# Patient Record
Sex: FEMALE | Race: BLACK OR AFRICAN AMERICAN | NOT HISPANIC OR LATINO | Employment: FULL TIME | URBAN - METROPOLITAN AREA
[De-identification: names, ages, dates, MRNs, and addresses within clinical notes are randomized per-mention and may not be internally consistent; named-entity substitution may affect disease eponyms.]

---
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
## 2019-01-07 ENCOUNTER — HOSPITAL ENCOUNTER (EMERGENCY)
Facility: HOSPITAL | Age: 19
Discharge: HOME | End: 2019-01-07
Attending: EMERGENCY MEDICINE | Admitting: EMERGENCY MEDICINE
Payer: COMMERCIAL

## 2019-01-07 VITALS
DIASTOLIC BLOOD PRESSURE: 68 MMHG | HEART RATE: 57 BPM | WEIGHT: 265 LBS | HEIGHT: 64 IN | OXYGEN SATURATION: 100 % | SYSTOLIC BLOOD PRESSURE: 140 MMHG | RESPIRATION RATE: 16 BRPM | BODY MASS INDEX: 45.24 KG/M2 | TEMPERATURE: 98.3 F

## 2019-01-07 DIAGNOSIS — J11.1 INFLUENZA: Primary | ICD-10-CM

## 2019-01-07 PROCEDURE — 99283 EMERGENCY DEPT VISIT LOW MDM: CPT

## 2019-01-07 NOTE — DISCHARGE INSTRUCTIONS
Influenza, Ambulatory Care   GENERAL INFORMATION:   Influenza (the flu) is an infection caused by the influenza virus  The flu is easily spread when an infected person coughs, sneezes, or has close contact with others  You may be able to spread the flu to others for 1 week or longer after signs or symptoms appear  Common symptoms include the following:   · Fever and chills    · Headaches, body aches, and muscle or joint pain    · Cough, runny nose, and sore throat    · Loss of appetite, nausea, vomiting, or diarrhea    · Tiredness    · Trouble breathing  Seek immediate care for the following symptoms:   · Dizziness    · Urinating less or not at all    · A seizure    · A headache, stiff neck, and confusion    · Trouble breathing with blue or purple lips    · New pain or pressure in your chest  Treatment for influenza  may include any of the following:  · Acetaminophen  decreases pain and fever  It is available without a doctor's order  Ask your healthcare provider how much to take and how often to take it  Follow directions  Acetaminophen can cause liver damage if not taken correctly  · NSAIDs  help decrease swelling and pain or fever  This medicine is available with or without a doctor's order  NSAIDs can cause stomach bleeding or kidney problems in certain people  If you take blood thinner medicine, always ask your healthcare provider if NSAIDs are safe for you  Always read the medicine label and follow directions  · Antivirals  are given to fight an infection caused by a virus  Manage your symptoms:   · Get more rest and sleep  to help you get better faster when you have the flu  · Drink more liquids as directed  Ask your healthcare provider how much liquid to drink each day and which liquids are best for you  Drinking liquids helps prevent dehydration  Prevent the spread of influenza:   · Wash your hands often  Use soap and water  Use gel hand cleanser when there is no soap and water available   Do not touch your eyes, nose, or mouth unless you have washed your hands first            · Cover your mouth and nose with a tissue when you sneeze or cough  Throw the tissue in the trash right away  · Clean shared items  Clean table surfaces, doorknobs, and light switches with a germ-killing   Do not share towels, silverware, or dishes with people who are sick  Wash bed sheets, towels, silverware, and dishes with soap and water  · Wear a face mask  over your mouth and nose if you have the flu or are near anyone who has the flu  Ask where to buy single-use masks  · Stay home if you are sick  Stay away from others as much as possible while you recover  · Get an influenza vaccine  to help prevent the flu  Everyone older than age 7 months should get a yearly influenza vaccine  Get the vaccine as soon as it is available, usually in October or November each year  Follow up with your healthcare provider as directed:  Write down your questions so you remember to ask them during your visits  CARE AGREEMENT:   You have the right to help plan your care  Learn about your health condition and how it may be treated  Discuss treatment options with your caregivers to decide what care you want to receive  You always have the right to refuse treatment  The above information is an  only  It is not intended as medical advice for individual conditions or treatments  Talk to your doctor, nurse or pharmacist before following any medical regimen to see if it is safe and effective for you  © 2014 [address] is for End User's use only and may not be sold, redistributed or otherwise used for commercial purposes  All illustrations and images included in CareNotes® are the copyrighted property of A [employer]  or [employer]w [employer]

## 2019-01-07 NOTE — ED PROVIDER NOTES
History  Chief Complaint   Patient presents with    URI     cough congestion body aches  Onset saturday night  No flu shot       History provided by:  Patient  URI   Presenting symptoms: congestion, fever, rhinorrhea and sore throat    Presenting symptoms: no cough    Severity:  Moderate  Onset quality:  Gradual  Duration:  3 days  Timing:  Intermittent  Progression:  Waxing and waning  Chronicity:  New  Relieved by:  Nothing (dayquil)  Worsened by:  Nothing  Ineffective treatments:  None tried  Associated symptoms: myalgias    Associated symptoms: no headaches, no neck pain, no swollen glands and no wheezing    Risk factors: sick contacts    Risk factors: no recent illness        Prior to Admission Medications   Prescriptions Last Dose Informant Patient Reported? Taking?   ibuprofen (MOTRIN) 600 mg tablet   No No   Sig: Take 1 tablet by mouth every 6 (six) hours as needed for mild pain for up to 10 days      Facility-Administered Medications: None       No past medical history on file  No past surgical history on file  No family history on file  I have reviewed and agree with the history as documented  Social History   Substance Use Topics    Smoking status: Never Smoker    Smokeless tobacco: Never Used    Alcohol use No        Review of Systems   Constitutional: Positive for fever  Negative for activity change, chills and diaphoresis  HENT: Positive for congestion, rhinorrhea and sore throat  Negative for sinus pressure  Eyes: Negative for pain and visual disturbance  Respiratory: Negative for cough, chest tightness, shortness of breath, wheezing and stridor  Cardiovascular: Negative for chest pain and palpitations  Gastrointestinal: Negative for abdominal distention, abdominal pain, constipation, diarrhea, nausea and vomiting  Genitourinary: Negative for dysuria and frequency  Musculoskeletal: Positive for myalgias  Negative for neck pain and neck stiffness     Skin: Negative for rash    Neurological: Negative for dizziness, speech difficulty, light-headedness, numbness and headaches  Physical Exam  Physical Exam   Constitutional: She is oriented to person, place, and time  She appears well-developed  No distress  HENT:   Head: Normocephalic and atraumatic  Nasal congestion, cobblestoning     Eyes: Pupils are equal, round, and reactive to light  Neck: Normal range of motion  Neck supple  No tracheal deviation present  Cardiovascular: Normal rate, regular rhythm, normal heart sounds and intact distal pulses  No murmur heard  Pulmonary/Chest: Effort normal and breath sounds normal  No stridor  No respiratory distress  Abdominal: Soft  She exhibits no distension  There is no tenderness  There is no rebound and no guarding  Musculoskeletal: Normal range of motion  Neurological: She is alert and oriented to person, place, and time  Skin: Skin is warm and dry  She is not diaphoretic  No erythema  No pallor  Psychiatric: She has a normal mood and affect  Vitals reviewed  Vital Signs  ED Triage Vitals [01/07/19 1244]   Temperature Pulse Respirations Blood Pressure SpO2   98 3 °F (36 8 °C) 57 16 140/68 100 %      Temp Source Heart Rate Source Patient Position - Orthostatic VS BP Location FiO2 (%)   Oral Monitor Sitting Left arm --      Pain Score       8           Vitals:    01/07/19 1244   BP: 140/68   Pulse: 57   Patient Position - Orthostatic VS: Sitting       Visual Acuity      ED Medications  Medications - No data to display    Diagnostic Studies  Results Reviewed     None                 No orders to display              Procedures  Procedures       Phone Contacts  ED Phone Contact    ED Course                               MDM  Number of Diagnoses or Management Options  Influenza: new and requires workup  Diagnosis management comments: Pt presents with influenze like illness, out of tamiful window, tx with supportive care           Amount and/or Complexity of Data Reviewed  Review and summarize past medical records: yes      CritCare Time    Disposition  Final diagnoses:   Influenza     Time reflects when diagnosis was documented in both MDM as applicable and the Disposition within this note     Time User Action Codes Description Comment    1/7/2019  1:17 PM Loi De Leon Add [J11 1] Influenza       ED Disposition     ED Disposition Condition Comment    Discharge  Mita Guerrero discharge to home/self care  Condition at discharge: Good        Follow-up Information     Follow up With Specialties Details Why Contact Info Additional [address] Emergency Department Emergency Medicine Go to If symptoms worsen [address] [facility]  AN ED, [address], [place], [address]          Patient's Medications   Discharge Prescriptions    No medications on file     No discharge procedures on file      ED Provider  Electronically Signed by           Chantlele Gutierrez DO  01/07/19 6058

## 2019-02-22 ENCOUNTER — HOSPITAL ENCOUNTER (EMERGENCY)
Facility: HOSPITAL | Age: 19
Discharge: HOME | End: 2019-02-22
Attending: EMERGENCY MEDICINE

## 2019-02-22 VITALS
BODY MASS INDEX: 45.41 KG/M2 | SYSTOLIC BLOOD PRESSURE: 136 MMHG | TEMPERATURE: 98.1 F | OXYGEN SATURATION: 100 % | HEART RATE: 79 BPM | RESPIRATION RATE: 16 BRPM | DIASTOLIC BLOOD PRESSURE: 99 MMHG | WEIGHT: 264.55 LBS

## 2019-02-22 DIAGNOSIS — J20.9 ACUTE BRONCHITIS: Primary | ICD-10-CM

## 2019-02-22 PROCEDURE — 99283 EMERGENCY DEPT VISIT LOW MDM: CPT

## 2019-02-22 RX ORDER — ALBUTEROL SULFATE 2.5 MG/3ML
5 SOLUTION RESPIRATORY (INHALATION) ONCE
Status: COMPLETED | OUTPATIENT
Start: 2019-02-22 | End: 2019-02-22

## 2019-02-22 RX ORDER — PREDNISONE 20 MG/1
60 TABLET ORAL ONCE
Status: COMPLETED | OUTPATIENT
Start: 2019-02-22 | End: 2019-02-22

## 2019-02-22 RX ORDER — AZITHROMYCIN 250 MG/1
TABLET, FILM COATED ORAL
Qty: 6 TABLET | Refills: 0 | Status: SHIPPED | OUTPATIENT
Start: 2019-02-22 | End: 2019-02-26

## 2019-02-22 RX ORDER — ALBUTEROL SULFATE 90 UG/1
2 AEROSOL, METERED RESPIRATORY (INHALATION) ONCE
Status: COMPLETED | OUTPATIENT
Start: 2019-02-22 | End: 2019-02-22

## 2019-02-22 RX ORDER — PREDNISONE 20 MG/1
TABLET ORAL
Qty: 10 TABLET | Refills: 0 | Status: SHIPPED | OUTPATIENT
Start: 2019-02-23

## 2019-02-22 RX ADMIN — ALBUTEROL SULFATE 5 MG: 2.5 SOLUTION RESPIRATORY (INHALATION) at 07:59

## 2019-02-22 RX ADMIN — PREDNISONE 60 MG: 20 TABLET ORAL at 07:57

## 2019-02-22 RX ADMIN — ALBUTEROL SULFATE 2 PUFF: 90 AEROSOL, METERED RESPIRATORY (INHALATION) at 08:25

## 2019-02-22 RX ADMIN — IPRATROPIUM BROMIDE 0.5 MG: 0.5 SOLUTION RESPIRATORY (INHALATION) at 07:59

## 2019-02-22 NOTE — ED NOTES
Pt  Ambulated out of dept , vss, normal gait, no acute distress       Pa Schroeder, RN  02/22/19 7267

## 2019-02-22 NOTE — ED PROVIDER NOTES
History  Chief Complaint   Patient presents with    URI     Pt states that she has been sick with flu like symptoms for a month  Pt states that she did not follow up with the family doctor  Pt states that she is not getting better  23-year-old female presents to the emergency department for evaluation of cough and nasal congestion for the past 1 month  Patient states that she was evaluated in the emergency department approximately 4 weeks ago  At that time she was diagnosed with an influenza like illness and was told to take guaifenesin and treat her symptoms with fluids and Tylenol  She has been taking guaifenesin for the past 4 weeks but feels that she is getting worse  Today her chest feels tight and she has been coughing  She does feel like she is wheezing  She continues with nasal congestion and drainage  Patient reports headache  No sore throat or fever  No nausea, vomiting, diarrhea  Patient is nontoxic appearing in no acute distress  History provided by:  Patient   used: No    URI   Presenting symptoms: congestion, cough, fatigue and rhinorrhea    Presenting symptoms: no ear pain and no fever    Severity:  Moderate  Onset quality:  Gradual  Duration:  4 weeks  Timing:  Constant  Progression:  Unchanged  Chronicity:  New  Relieved by:  Nothing  Worsened by:  Nothing  Ineffective treatments:  OTC medications  Associated symptoms: headaches and wheezing    Associated symptoms: no arthralgias, no myalgias and no swollen glands    Risk factors: no diabetes mellitus        Prior to Admission Medications   Prescriptions Last Dose Informant Patient Reported? Taking?   ibuprofen (MOTRIN) 600 mg tablet   No No   Sig: Take 1 tablet by mouth every 6 (six) hours as needed for mild pain for up to 10 days      Facility-Administered Medications: None       History reviewed  No pertinent past medical history  History reviewed  No pertinent surgical history  History reviewed  No pertinent family history  I have reviewed and agree with the history as documented  Social History     Tobacco Use    Smoking status: Never Smoker    Smokeless tobacco: Never Used   Substance Use Topics    Alcohol use: No    Drug use: No        Review of Systems   Constitutional: Positive for fatigue  Negative for fever  HENT: Positive for congestion, rhinorrhea and sinus pressure  Negative for ear pain and facial swelling  Respiratory: Positive for cough and wheezing  Gastrointestinal: Negative for abdominal pain, diarrhea and nausea  Musculoskeletal: Negative for arthralgias and myalgias  Neurological: Positive for headaches  All other systems reviewed and are negative  Physical Exam  Physical Exam   Constitutional: She is oriented to person, place, and time  She appears well-developed and well-nourished  HENT:   Head: Normocephalic  Nose: Nose normal    Mouth/Throat: Uvula is midline, oropharynx is clear and moist and mucous membranes are normal  No oropharyngeal exudate  No tonsillar exudate  Eyes: Pupils are equal, round, and reactive to light  Conjunctivae and EOM are normal    Neck: Normal range of motion  Neck supple  Cardiovascular: Normal rate, regular rhythm, normal heart sounds and intact distal pulses  No murmur heard  Pulmonary/Chest: Effort normal  No respiratory distress  She has wheezes  She has rhonchi  Abdominal: Soft  Bowel sounds are normal  She exhibits no distension  There is no tenderness  There is no rebound and no guarding  Musculoskeletal: Normal range of motion  She exhibits no edema, tenderness or deformity  Lymphadenopathy:     She has no cervical adenopathy  Neurological: She is alert and oriented to person, place, and time  She has normal strength and normal reflexes  No cranial nerve deficit or sensory deficit  She exhibits normal muscle tone  Coordination and gait normal    Skin: Skin is warm, dry and intact  No rash noted  Psychiatric: She has a normal mood and affect  Her behavior is normal  Judgment and thought content normal    Nursing note and vitals reviewed  Vital Signs  ED Triage Vitals [02/22/19 0736]   Temperature Pulse Respirations Blood Pressure SpO2   98 1 °F (36 7 °C) 79 16 136/99 100 %      Temp Source Heart Rate Source Patient Position - Orthostatic VS BP Location FiO2 (%)   Oral Monitor Sitting Right arm --      Pain Score       8           Vitals:    02/22/19 0736   BP: 136/99   Pulse: 79   Patient Position - Orthostatic VS: Sitting       Visual Acuity      ED Medications  Medications   albuterol inhalation solution 5 mg (5 mg Nebulization Given 2/22/19 0759)   ipratropium (ATROVENT) 0 02 % inhalation solution 0 5 mg (0 5 mg Nebulization Given 2/22/19 0759)   predniSONE tablet 60 mg (60 mg Oral Given 2/22/19 0757)   albuterol (PROVENTIL HFA,VENTOLIN HFA) inhaler 2 puff (2 puffs Inhalation Given 2/22/19 0825)       Diagnostic Studies  Results Reviewed     None                 No orders to display              Procedures  Procedures       Phone Contacts  ED Phone Contact    ED Course                               MDM  Number of Diagnoses or Management Options  Acute bronchitis: new and requires workup     Amount and/or Complexity of Data Reviewed  Decide to obtain previous medical records or to obtain history from someone other than the patient: yes  Discuss the patient with other providers: yes    Risk of Complications, Morbidity, and/or Mortality  General comments: 22-year-old female presents with 1 month history of worsening cough and congestion, today she is wheezing  Patient feels overall her symptoms are worsening and not improving as expected  Patient has acute wheezy bronchitis  Will treat with oral corticosteroid and inhaler, will add antibiotic due to prolonged nature of her symptoms  Discussed signs and symptoms to return to the emergency department    Patient did have improved air flow after receiving nebulizer treatment in the emergency department  Patient Progress  Patient progress: stable      Disposition  Final diagnoses:   Acute bronchitis     Time reflects when diagnosis was documented in both MDM as applicable and the Disposition within this note     Time User Action Codes Description Comment    2/22/2019  8:19 AM Coni Fabry Add [J20 9] Acute bronchitis       ED Disposition     ED Disposition Condition Date/Time Comment    Discharge Stable Fri Feb 22, 2019  8:19 AM Genaro Jeffery discharge to home/self care  Follow-up Information    None         Patient's Medications   Discharge Prescriptions    AZITHROMYCIN (ZITHROMAX) 250 MG TABLET    Take 2 tablets today then 1 tablet daily x 4 days       Start Date: 2/22/2019 End Date: 2/26/2019       Order Dose: --       Quantity: 6 tablet    Refills: 0    PREDNISONE 20 MG TABLET    Take 2 tabs daily for 5 days       Start Date: 2/23/2019 End Date: --       Order Dose: --       Quantity: 10 tablet    Refills: 0     No discharge procedures on file      ED Provider  Electronically Signed by           Sarah Ferrara DO  02/22/19 0830

## 2020-05-11 ENCOUNTER — TELEPHONE (OUTPATIENT)
Dept: OBGYN CLINIC | Facility: CLINIC | Age: 20
End: 2020-05-11

## 2020-05-18 ENCOUNTER — TELEMEDICINE (OUTPATIENT)
Dept: OBGYN CLINIC | Facility: CLINIC | Age: 20
End: 2020-05-18

## 2020-05-18 DIAGNOSIS — Z30.09 ENCOUNTER FOR OTHER GENERAL COUNSELING OR ADVICE ON CONTRACEPTION: Primary | ICD-10-CM

## 2020-05-18 PROCEDURE — G2012 BRIEF CHECK IN BY MD/QHP: HCPCS | Performed by: OBSTETRICS & GYNECOLOGY

## 2020-06-30 ENCOUNTER — TELEPHONE (OUTPATIENT)
Dept: OBGYN CLINIC | Facility: CLINIC | Age: 20
End: 2020-06-30

## 2020-06-30 NOTE — TELEPHONE ENCOUNTER
Pt called asking about nexplanon , wondering if benefits have been verified and if have them in stock    Pt made aware, they are in  to be ordered, will call once they are approved    Offered to schedule appt or have provider prescribe another method  Of birth control until they are approved      Pt declined

## 2021-12-30 ENCOUNTER — HOSPITAL ENCOUNTER (EMERGENCY)
Facility: HOSPITAL | Age: 21
Discharge: HOME | End: 2021-12-31
Attending: EMERGENCY MEDICINE | Admitting: EMERGENCY MEDICINE
Payer: OTHER MISCELLANEOUS

## 2021-12-30 DIAGNOSIS — M54.9 BACK PAIN: Primary | ICD-10-CM

## 2021-12-30 LAB
ALBUMIN SERPL BCP-MCNC: 3.6 G/DL (ref 3.5–5)
ALP SERPL-CCNC: 76 U/L (ref 46–116)
ALT SERPL W P-5'-P-CCNC: 40 U/L (ref 12–78)
ANION GAP SERPL CALCULATED.3IONS-SCNC: 8 MMOL/L (ref 4–13)
AST SERPL W P-5'-P-CCNC: 19 U/L (ref 5–45)
BASOPHILS # BLD AUTO: 0.04 THOUSANDS/ΜL (ref 0–0.1)
BASOPHILS NFR BLD AUTO: 1 % (ref 0–1)
BILIRUB SERPL-MCNC: 0.37 MG/DL (ref 0.2–1)
BUN SERPL-MCNC: 10 MG/DL (ref 5–25)
CALCIUM SERPL-MCNC: 9.5 MG/DL (ref 8.3–10.1)
CHLORIDE SERPL-SCNC: 102 MMOL/L (ref 100–108)
CO2 SERPL-SCNC: 26 MMOL/L (ref 21–32)
CREAT SERPL-MCNC: 0.95 MG/DL (ref 0.6–1.3)
EOSINOPHIL # BLD AUTO: 0.04 THOUSAND/ΜL (ref 0–0.61)
EOSINOPHIL NFR BLD AUTO: 1 % (ref 0–6)
ERYTHROCYTE [DISTWIDTH] IN BLOOD BY AUTOMATED COUNT: 13 % (ref 11.6–15.1)
GFR SERPL CREATININE-BSD FRML MDRD: 84 ML/MIN/1.73SQ M
GLUCOSE SERPL-MCNC: 86 MG/DL (ref 65–140)
HCT VFR BLD AUTO: 39.9 % (ref 34.8–46.1)
HGB BLD-MCNC: 13.1 G/DL (ref 11.5–15.4)
IMM GRANULOCYTES # BLD AUTO: 0.03 THOUSAND/UL (ref 0–0.2)
IMM GRANULOCYTES NFR BLD AUTO: 0 % (ref 0–2)
LYMPHOCYTES # BLD AUTO: 2.27 THOUSANDS/ΜL (ref 0.6–4.47)
LYMPHOCYTES NFR BLD AUTO: 33 % (ref 14–44)
MCH RBC QN AUTO: 28.4 PG (ref 26.8–34.3)
MCHC RBC AUTO-ENTMCNC: 32.8 G/DL (ref 31.4–37.4)
MCV RBC AUTO: 87 FL (ref 82–98)
MONOCYTES # BLD AUTO: 0.83 THOUSAND/ΜL (ref 0.17–1.22)
MONOCYTES NFR BLD AUTO: 12 % (ref 4–12)
NEUTROPHILS # BLD AUTO: 3.65 THOUSANDS/ΜL (ref 1.85–7.62)
NEUTS SEG NFR BLD AUTO: 53 % (ref 43–75)
NRBC BLD AUTO-RTO: 0 /100 WBCS
PLATELET # BLD AUTO: 262 THOUSANDS/UL (ref 149–390)
PMV BLD AUTO: 10.6 FL (ref 8.9–12.7)
POTASSIUM SERPL-SCNC: 3.6 MMOL/L (ref 3.5–5.3)
PROT SERPL-MCNC: 7.8 G/DL (ref 6.4–8.2)
RBC # BLD AUTO: 4.61 MILLION/UL (ref 3.81–5.12)
SODIUM SERPL-SCNC: 136 MMOL/L (ref 136–145)
WBC # BLD AUTO: 6.86 THOUSAND/UL (ref 4.31–10.16)

## 2021-12-30 PROCEDURE — 84703 CHORIONIC GONADOTROPIN ASSAY: CPT | Performed by: EMERGENCY MEDICINE

## 2021-12-30 PROCEDURE — 96374 THER/PROPH/DIAG INJ IV PUSH: CPT

## 2021-12-30 PROCEDURE — 99284 EMERGENCY DEPT VISIT MOD MDM: CPT

## 2021-12-30 PROCEDURE — 80053 COMPREHEN METABOLIC PANEL: CPT | Performed by: EMERGENCY MEDICINE

## 2021-12-30 PROCEDURE — 96375 TX/PRO/DX INJ NEW DRUG ADDON: CPT

## 2021-12-30 PROCEDURE — 85025 COMPLETE CBC W/AUTO DIFF WBC: CPT | Performed by: EMERGENCY MEDICINE

## 2021-12-30 PROCEDURE — 99284 EMERGENCY DEPT VISIT MOD MDM: CPT | Performed by: EMERGENCY MEDICINE

## 2021-12-30 PROCEDURE — 36415 COLL VENOUS BLD VENIPUNCTURE: CPT | Performed by: EMERGENCY MEDICINE

## 2021-12-30 RX ORDER — KETOROLAC TROMETHAMINE 30 MG/ML
15 INJECTION, SOLUTION INTRAMUSCULAR; INTRAVENOUS ONCE
Status: COMPLETED | OUTPATIENT
Start: 2021-12-30 | End: 2021-12-30

## 2021-12-30 RX ADMIN — KETOROLAC TROMETHAMINE 15 MG: 30 INJECTION, SOLUTION INTRAMUSCULAR at 22:57

## 2021-12-30 RX ADMIN — Medication 2 MG: at 22:57

## 2021-12-30 NOTE — Clinical Note
Matt Darrionron was seen and treated in our emergency department on 12/30/2021  Diagnosis:     Malia Balbuena  may return to work on return date  She may return on this date: 01/04/2022         If you have any questions or concerns, please don't hesitate to call        Chuck Gross MD    ______________________________           _______________          _______________  [facility] [place] [facility]                              Date                                Time

## 2021-12-31 ENCOUNTER — APPOINTMENT (EMERGENCY)
Dept: MRI IMAGING | Facility: HOSPITAL | Age: 21
End: 2021-12-31
Payer: OTHER MISCELLANEOUS

## 2021-12-31 VITALS
OXYGEN SATURATION: 99 % | HEART RATE: 94 BPM | RESPIRATION RATE: 20 BRPM | SYSTOLIC BLOOD PRESSURE: 128 MMHG | DIASTOLIC BLOOD PRESSURE: 72 MMHG | TEMPERATURE: 99 F

## 2021-12-31 LAB — HCG SERPL QL: NEGATIVE

## 2021-12-31 PROCEDURE — 72148 MRI LUMBAR SPINE W/O DYE: CPT

## 2021-12-31 PROCEDURE — G1004 CDSM NDSC: HCPCS

## 2021-12-31 RX ORDER — CYCLOBENZAPRINE HCL 10 MG
10 TABLET ORAL 2 TIMES DAILY PRN
Qty: 8 TABLET | Refills: 0 | Status: SHIPPED | OUTPATIENT
Start: 2021-12-31

## 2021-12-31 RX ORDER — ACETAMINOPHEN 325 MG/1
650 TABLET ORAL ONCE
Status: COMPLETED | OUTPATIENT
Start: 2021-12-31 | End: 2021-12-31

## 2021-12-31 RX ORDER — ACETAMINOPHEN 325 MG/1
650 TABLET ORAL EVERY 6 HOURS PRN
Qty: 30 TABLET | Refills: 0 | Status: SHIPPED | OUTPATIENT
Start: 2021-12-31 | End: 2022-01-05

## 2021-12-31 RX ORDER — IBUPROFEN 400 MG/1
400 TABLET ORAL EVERY 8 HOURS SCHEDULED
Qty: 15 TABLET | Refills: 0 | Status: SHIPPED | OUTPATIENT
Start: 2021-12-31 | End: 2022-01-05

## 2021-12-31 RX ADMIN — DICLOFENAC SODIUM TOPICAL GEL, 1%, 4 G: 10 GEL TOPICAL at 02:49

## 2021-12-31 RX ADMIN — ACETAMINOPHEN 650 MG: 325 TABLET, FILM COATED ORAL at 02:49

## 2021-12-31 NOTE — ED NOTES
Patient transported to [address] [place] [address], [address] [place] [address]  12/31/21 0106

## 2021-12-31 NOTE — ED PROVIDER NOTES
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  Reports mid/low L back pain, seen for it prescribed naproxen and steroid  Pain getting worse, reports numb/tingling down L leg as of today     HPI    Patient is a pleasant 19 yof who presents with achi back pain  Notes this started at work  Patient notes the pain radiates down the legs and is having difficulty lifting her legs  No saddle anesthesia or urinary retention  No f/c/s to suggest abscess  However, during initial exam patient was having difficulty lifting legs, as such, MRI ordered for back pain with weakness to rule out cauda equina  MDM back pain, MRI Negative for acute process, suspect MSK pain, will dc, followup Spine and pain if symptoms continue  Prior to Admission Medications   Prescriptions Last Dose Informant Patient Reported? Taking?   ibuprofen (MOTRIN) 600 mg tablet   No No   Sig: Take 1 tablet by mouth every 6 (six) hours as needed for mild pain for up to 10 days   predniSONE 20 mg tablet Not Taking at Unknown time  No No   Sig: Take 2 tabs daily for 5 days   Patient not taking: Reported on 12/30/2021       Facility-Administered Medications: None       History reviewed  No pertinent past medical history  History reviewed  No pertinent surgical history  History reviewed  No pertinent family history  I have reviewed and agree with the history as documented  E-Cigarette/Vaping     E-Cigarette/Vaping Substances     Social History     Tobacco Use    Smoking status: Never Smoker    Smokeless tobacco: Never Used   Substance Use Topics    Alcohol use: No     Comment: socially     Drug use: No       Review of Systems   Musculoskeletal: Positive for back pain  All other systems reviewed and are negative  Physical Exam  Physical Exam  Vitals and nursing note reviewed     Constitutional:       Appearance: She is well-developed  HENT:      Head: Normocephalic and atraumatic  Right Ear: External ear normal       Left Ear: External ear normal    Eyes:      Conjunctiva/sclera: Conjunctivae normal    Neck:      Vascular: No JVD  Trachea: No tracheal deviation  Cardiovascular:      Rate and Rhythm: Normal rate and regular rhythm  Heart sounds: Normal heart sounds  Pulmonary:      Effort: Pulmonary effort is normal  No respiratory distress  Breath sounds: No wheezing or rales  Abdominal:      Palpations: Abdomen is soft  Tenderness: There is no abdominal tenderness  There is no guarding or rebound  Musculoskeletal:         General: No tenderness  Cervical back: Normal range of motion and neck supple  Skin:     General: Skin is warm and dry  Findings: No erythema or rash  Neurological:      General: No focal deficit present  Mental Status: She is alert and oriented to person, place, and time  Motor: No weakness  Psychiatric:         Behavior: Behavior normal          Thought Content:  Thought content normal          Vital Signs  ED Triage Vitals [12/30/21 2107]   Temperature Pulse Respirations Blood Pressure SpO2   99 °F (37 2 °C) 96 20 137/75 100 %      Temp Source Heart Rate Source Patient Position - Orthostatic VS BP Location FiO2 (%)   Oral Monitor Sitting Right arm --      Pain Score       9           Vitals:    12/30/21 2107 12/31/21 0131   BP: 137/75 128/72   Pulse: 96 94   Patient Position - Orthostatic VS: Sitting Lying         Visual Acuity  Visual Acuity      Most Recent Value   L Pupil Size (mm) 3   R Pupil Size (mm) 3          ED Medications  Medications   ketorolac (TORADOL) injection 15 mg (15 mg Intravenous Given 12/30/21 2257)   acetaminophen (TYLENOL) tablet 650 mg (650 mg Oral Given 12/31/21 0249)   Diclofenac Sodium (VOLTAREN) 1 % topical gel 4 g (4 g Topical Given 12/31/21 0249)       Diagnostic Studies  Results Reviewed     Procedure Component Value Units Date/Time    hCG, qualitative pregnancy [[id number]]  (Normal) Collected: 12/30/21 2235    Lab Status: Final result Specimen: Blood from Arm, Left Updated: 12/31/21 0116     Preg, Serum Negative    Comprehensive metabolic panel [[id number]] Collected: 12/30/21 2235    Lab Status: Final result Specimen: Blood from Arm, Left Updated: 12/30/21 2301     Sodium 136 mmol/L      Potassium 3 6 mmol/L      Chloride 102 mmol/L      CO2 26 mmol/L      ANION GAP 8 mmol/L      BUN 10 mg/dL      Creatinine 0 95 mg/dL      Glucose 86 mg/dL      Calcium 9 5 mg/dL      AST 19 U/L      ALT 40 U/L      Alkaline Phosphatase 76 U/L      Total Protein 7 8 g/dL      Albumin 3 6 g/dL      Total Bilirubin 0 37 mg/dL      eGFR 84 ml/min/1 73sq m     Narrative:      [place] guidelines for Chronic Kidney Disease (CKD):     Stage 1 with normal or high GFR (GFR > 90 mL/min/1 73 square meters)    Stage 2 Mild CKD (GFR = 60-89 mL/min/1 73 square meters)    Stage 3A Moderate CKD (GFR = 45-59 mL/min/1 73 square meters)    Stage 3B Moderate CKD (GFR = 30-44 mL/min/1 73 square meters)    Stage 4 Severe CKD (GFR = 15-29 mL/min/1 73 square meters)    Stage 5 End Stage CKD (GFR <15 mL/min/1 73 square meters)  Note: GFR calculation is accurate only with a steady state creatinine    CBC and differential [[id number]] Collected: 12/30/21 2235    Lab Status: Final result Specimen: Blood from Arm, Left Updated: 12/30/21 2248     WBC 6 86 Thousand/uL      RBC 4 61 Million/uL      Hemoglobin 13 1 g/dL      Hematocrit 39 9 %      MCV 87 fL      MCH 28 4 pg      MCHC 32 8 g/dL      RDW 13 0 %      MPV 10 6 fL      Platelets 963 Thousands/uL      nRBC 0 /100 WBCs      Neutrophils Relative 53 %      Immat GRANS % 0 %      Lymphocytes Relative 33 %      Monocytes Relative 12 %      Eosinophils Relative 1 %      Basophils Relative 1 %      Neutrophils Absolute 3 65 Thousands/µL      Immature Grans Absolute 0 03 Thousand/uL Lymphocytes Absolute 2 27 Thousands/µL      Monocytes Absolute 0 83 Thousand/µL      Eosinophils Absolute 0 04 Thousand/µL      Basophils Absolute 0 04 Thousands/µL                  MRI lumbar spine wo contrast   Final Result by Mike Wright MD (12/31 0115)      Unremarkable unenhanced MRI of the lumbar spine  Workstation performed: [id number]                    Procedures  Procedures         ED Course                               SBIRT 22yo+      Most Recent Value   SBIRT (24 yo +)    In order to provide better care to our patients, we are screening all of our patients for alcohol and drug use  Would it be okay to ask you these screening questions? No Filed at: 12/30/2021 2318                    MDM    Disposition  Final diagnoses:   Back pain     Time reflects when diagnosis was documented in both MDM as applicable and the Disposition within this note     Time User Action Codes Description Comment    12/31/2021  2:37 AM Bertha HERNANDES Add [M54 9] Back pain       ED Disposition     ED Disposition Condition Date/Time Comment    Discharge Stable Fri Dec 31, 2021  2:37 AM Toño Soto discharge to home/self care              Follow-up Information     Follow up With Specialties Details Why Contact Info    Shira Robles MD Family Medicine In 1 day  [address] [phone]      [facility]  Call in 3 days  [facility]  [address]   [phone]          Discharge Medication List as of 12/31/2021  2:45 AM      START taking these medications    Details   acetaminophen (TYLENOL) 325 mg tablet Take 2 tablets (650 mg total) by mouth every 6 (six) hours as needed for mild pain for up to 5 days, Starting Fri 12/31/2021, Until Wed 1/5/2022 at 2359, Print      cyclobenzaprine (FLEXERIL) 10 mg tablet Take 1 tablet (10 mg total) by mouth 2 (two) times a day as needed for muscle spasms, Starting Fri 12/31/2021, Print      Diclofenac Sodium (VOLTAREN) 1 % Apply 2 g topically 4 (four) times a day, Starting Fri 12/31/2021, Print         CONTINUE these medications which have CHANGED    Details   ibuprofen (MOTRIN) 400 mg tablet Take 1 tablet (400 mg total) by mouth every 8 (eight) hours for 5 days, Starting Fri 12/31/2021, Until Wed 1/5/2022, Print         CONTINUE these medications which have NOT CHANGED    Details   predniSONE 20 mg tablet Take 2 tabs daily for 5 days, Print             No discharge procedures on file      PDMP Review     None          ED Provider  Electronically Signed by           Gagan Stark MD  01/01/22 2536

## 2021-12-31 NOTE — ED NOTES
Family at bedside  Pt states right foot is cold and numb and painful  Pt tearful while on stretcher       Lynne Freitas RN  12/30/21 4959

## 2024-12-16 ENCOUNTER — OFFICE VISIT (OUTPATIENT)
Dept: URGENT CARE | Age: 24
End: 2024-12-16
Payer: COMMERCIAL

## 2024-12-16 VITALS
SYSTOLIC BLOOD PRESSURE: 148 MMHG | RESPIRATION RATE: 18 BRPM | BODY MASS INDEX: 59.56 KG/M2 | HEART RATE: 90 BPM | OXYGEN SATURATION: 98 % | DIASTOLIC BLOOD PRESSURE: 90 MMHG | WEIGHT: 293 LBS | TEMPERATURE: 98.3 F

## 2024-12-16 DIAGNOSIS — R51.9 INTRACTABLE HEADACHE, UNSPECIFIED CHRONICITY PATTERN, UNSPECIFIED HEADACHE TYPE: Primary | ICD-10-CM

## 2024-12-16 PROCEDURE — G0383 LEV 4 HOSP TYPE B ED VISIT: HCPCS | Performed by: PHYSICIAN ASSISTANT

## 2024-12-16 PROCEDURE — 96372 THER/PROPH/DIAG INJ SC/IM: CPT | Performed by: PHYSICIAN ASSISTANT

## 2024-12-16 RX ORDER — ONDANSETRON 4 MG/1
4 TABLET, ORALLY DISINTEGRATING ORAL ONCE
Status: COMPLETED | OUTPATIENT
Start: 2024-12-16 | End: 2024-12-16

## 2024-12-16 RX ORDER — KETOROLAC TROMETHAMINE 30 MG/ML
15 INJECTION, SOLUTION INTRAMUSCULAR; INTRAVENOUS ONCE
Status: COMPLETED | OUTPATIENT
Start: 2024-12-16 | End: 2024-12-16

## 2024-12-16 RX ADMIN — KETOROLAC TROMETHAMINE 15 MG: 30 INJECTION, SOLUTION INTRAMUSCULAR; INTRAVENOUS at 16:55

## 2024-12-16 RX ADMIN — ONDANSETRON 4 MG: 4 TABLET, ORALLY DISINTEGRATING ORAL at 16:55

## 2024-12-16 NOTE — LETTER
December 16, 2024     Patient: Yee Barber   YOB: 2000   Date of Visit: 12/16/2024       To Whom It May Concern:    It is my medical opinion that Yee Barber may return to work on 12/17/24 .    If you have any questions or concerns, please don't hesitate to call.         Sincerely,        Jose C Mahan PA-C    CC: No Recipients

## 2024-12-16 NOTE — PROGRESS NOTES
[facility]        NAME: Yee Braber is a 24 y.o. female  : 2000    MRN: [id number]  DATE: 2024  TIME: 6:58 PM    Assessment and Plan   Intractable headache, unspecified chronicity pattern, unspecified headache type [R51.9]  1. Intractable headache, unspecified chronicity pattern, unspecified headache type  ketorolac (TORADOL) injection 15 mg    ondansetron (ZOFRAN-ODT) dispersible tablet 4 mg          Patient will take Benadryl at home given that she is driving herself.  If this does not work at terminating the current headache recommend she be seen in the emergency department otherwise follow-up PCP      The patient and/or parent/legal guardian verbalized understanding of exam findings and   Treatment plan. We engaged in the shared decision-making process and treatment options were   discussed at length with the patient.  All questions, concerns and complaints were answered and   addressed to the patient's' and/or parent/legal guardians's satisfaction.    Patient Instructions   There are no Patient Instructions on file for this visit.    Follow up with PCP in 3-5 days.  Proceed to  ER if symptoms worsen.    If tests are performed, our office will contact you with results only if   changes need to made to the care plan discussed with you at the visit.   [facility]     Chief Complaint     Chief Complaint   Patient presents with   • Headache     Patient states she has a migraine x 3 days that worsens at night and if she is in the dark it helps slightly          History of Present Illness       HPI  . Migraine has been nonstop for 72 hours. Tried aki and tylenol with no relief. Not being treated for nor has been diagnosed with migraines. Has had headaches like this before. Hot tea usually helps as well but not this time. No visual disturbances. Feels slight dizziness when lying supine, no gait or balance disturbance. No focal neuro deficits  noted.     Review of Systems   Review of Systems  All other related systems reviewed with patient or accompanying historian and are negative except as noted in HPI    Current Medications       Current Outpatient Medications:   •  cyclobenzaprine (FLEXERIL) 10 mg tablet, Take 1 tablet (10 mg total) by mouth 2 (two) times a day as needed for muscle spasms, Disp: 8 tablet, Rfl: 0  •  Diclofenac Sodium (VOLTAREN) 1 %, Apply 2 g topically 4 (four) times a day, Disp: 150 g, Rfl: 0  •  ibuprofen (MOTRIN) 400 mg tablet, Take 1 tablet (400 mg total) by mouth every 8 (eight) hours for 5 days, Disp: 15 tablet, Rfl: 0  •  predniSONE 20 mg tablet, Take 2 tabs daily for 5 days (Patient not taking: Reported on 12/30/2021 ), Disp: 10 tablet, Rfl: 0  No current facility-administered medications for this visit.    Current Allergies     Allergies as of 12/16/2024   • (No Known Allergies)            The following portions of the patient's history were reviewed and updated as appropriate: allergies, current medications, past family history, past medical history, past social history, past surgical history and problem list.     No past medical history on file.    No past surgical history on file.    No family history on file.      Medications have been verified.        Objective   /90 (BP Location: Left arm, Patient Position: Sitting, Cuff Size: Adult)   Pulse 90   Temp 98.3 °F (36.8 °C) (Tympanic)   Resp 18   Wt (!) 157 kg (347 lb)   SpO2 98%   BMI 59.56 kg/m²   No LMP recorded. Patient has had an implant.       Physical Exam     Physical Exam  Constitutional:       General: She is not in acute distress.     Appearance: She is well-developed.   HENT:      Head: Normocephalic and atraumatic.   Eyes:      General: No scleral icterus.     Conjunctiva/sclera: Conjunctivae normal.   Neck:      Trachea: No tracheal deviation.   Pulmonary:      Effort: Pulmonary effort is normal. No respiratory distress.      Breath sounds: No  "stridor.   Musculoskeletal:      Cervical back: Normal range of motion.   Skin:     General: Skin is warm and dry.      Findings: No erythema.   Neurological:      General: No focal deficit present.      Mental Status: She is alert and oriented to person, place, and time.      Cranial Nerves: No cranial nerve deficit.      Sensory: No sensory deficit.      Motor: No weakness.      Coordination: Coordination normal.      Gait: Gait normal.      Comments: Negative Romberg test   Psychiatric:         Behavior: Behavior normal.         Ortho Exam        Procedures  No Procedures performed today        Note: Portions of this record may have been created with voice recognition software. Occasional wrong word or \"sound a like\" substitutions may have occurred due to the inherent limitations of voice recognition software. Please read the chart carefully and recognize, using context, where substitutions have occurred.*      "

## 2025-01-24 ENCOUNTER — HOSPITAL ENCOUNTER (EMERGENCY)
Facility: HOSPITAL | Age: 25
Discharge: HOME | End: 2025-01-24
Attending: EMERGENCY MEDICINE
Payer: COMMERCIAL

## 2025-01-24 VITALS
OXYGEN SATURATION: 98 % | SYSTOLIC BLOOD PRESSURE: 108 MMHG | RESPIRATION RATE: 20 BRPM | DIASTOLIC BLOOD PRESSURE: 66 MMHG | HEART RATE: 101 BPM | TEMPERATURE: 99.6 F

## 2025-01-24 DIAGNOSIS — J11.1 INFLUENZA: Primary | ICD-10-CM

## 2025-01-24 LAB
FLUAV AG UPPER RESP QL IA.RAPID: NEGATIVE
FLUBV AG UPPER RESP QL IA.RAPID: POSITIVE
SARS-COV+SARS-COV-2 AG RESP QL IA.RAPID: NEGATIVE

## 2025-01-24 PROCEDURE — 87804 INFLUENZA ASSAY W/OPTIC: CPT

## 2025-01-24 PROCEDURE — 99284 EMERGENCY DEPT VISIT MOD MDM: CPT | Performed by: EMERGENCY MEDICINE

## 2025-01-24 PROCEDURE — 87811 SARS-COV-2 COVID19 W/OPTIC: CPT

## 2025-01-24 PROCEDURE — 99283 EMERGENCY DEPT VISIT LOW MDM: CPT

## 2025-01-24 RX ORDER — ACETAMINOPHEN 325 MG/1
650 TABLET ORAL ONCE
Status: COMPLETED | OUTPATIENT
Start: 2025-01-24 | End: 2025-01-24

## 2025-01-24 RX ORDER — IBUPROFEN 600 MG/1
600 TABLET, FILM COATED ORAL ONCE
Status: COMPLETED | OUTPATIENT
Start: 2025-01-24 | End: 2025-01-24

## 2025-01-24 RX ADMIN — ACETAMINOPHEN 650 MG: 325 TABLET, FILM COATED ORAL at 12:51

## 2025-01-24 RX ADMIN — IBUPROFEN 600 MG: 600 TABLET, FILM COATED ORAL at 14:17

## 2025-01-24 NOTE — Clinical Note
Yee Barber was seen and treated in our emergency department on 1/24/2025.                Diagnosis:     Yee  may return to work on return date.    She may return on this date: 01/27/2025         If you have any questions or concerns, please don't hesitate to call.      Guzman Stock V, DO    ______________________________           _______________          _______________  Hospital Representative                              Date                                Time

## 2025-01-24 NOTE — ED PROVIDER NOTES
Time reflects when diagnosis was documented in both MDM as applicable and the Disposition within this note       Time User Action Codes Description Comment    1/24/2025  2:45 PM Guzman Stock Add [J11.1] Influenza           ED Disposition       ED Disposition   Discharge    Condition   Stable    Date/Time   Fri Jan 24, 2025  2:45 PM    Comment   Yee Barber discharge to home/self care.                   Assessment & Plan       Medical Decision Making  24-year-old female, no past medical history, presenting to the emergency department with flulike symptoms ongoing for the last 5 days.  Initially febrile, tachycardic,  with minimal tachypnea, but no hypoxia.  Appears well but periodically coughing during examination    Differential diagnoses include but not limited to: COVID-19, influenza, less likely pneumonia, asthma exacerbation.    Orders written for COVID, flu swab.    Patient initially treated with Tylenol, for fever.    Patient noted to test positive for influenza.  Not a candidate for Tamiflu at this time given no high risk factors, symptoms ongoing for the last 5 days    On reassessment, patient remains without hypoxia.  Has had improvement in temperature, but mildly more tachycardic, but secondary likely to acute coughing fit. will provide patient with some Motrin, reevaluate symptoms.    On second reassessment, patient appears well, is in no acute distress, comfortable discharge at this time.  Has had continued improvement in vitals, decrease in heart rate, resolution, fever, patient denies any shortness of breath at this time.  Ambulatory with no respiratory distress, lungs clear to auscultation, offers no acute complaints at this time.   Advised the patient on the emergent signs and symptoms for which she should urgently return to the ED and encouraged continued follow-up with her PCP within 1 week for reevaluation of symptoms.  Patient verbalized understanding of plan of care was given the  opportunity ask questions.    Amount and/or Complexity of Data Reviewed  Labs:  Decision-making details documented in ED Course.    Risk  Prescription drug management.        ED Course as of 01/24/25 1522   Fri Jan 24, 2025   1519 Influenza B Rapid Antigen(!): Positive   1519 Temperature: 99.6 °F (37.6 °C)   1519 Pulse: 101   1519 Respirations: 20   1519 SpO2: 98 %       Medications   acetaminophen (TYLENOL) tablet 650 mg (650 mg Oral Given 1/24/25 1251)   ibuprofen (MOTRIN) tablet 600 mg (600 mg Oral Given 1/24/25 1417)       ED Risk Strat Scores                                              History of Present Illness       Chief Complaint   Patient presents with    Flu Symptoms     Pt reports increased work of breathing, fevers, chills, cough since Monday. Dyspneic at rest in triage box        History reviewed. No pertinent past medical history.   History reviewed. No pertinent surgical history.   History reviewed. No pertinent family history.   Social History     Tobacco Use    Smoking status: Never    Smokeless tobacco: Never   Substance Use Topics    Alcohol use: No     Comment: socially     Drug use: No      E-Cigarette/Vaping      E-Cigarette/Vaping Substances      I have reviewed and agree with the history as documented.       Flu Symptoms    Patient is a 24-year-old female, no past medical history, who presents to the emergency department for flulike symptoms ongoing since Monday.patient states that she has been experiencing a cough, shortness of breath, headaches, myalgias, and fevers.  States she was previously given a prescribed her from a telehealth visit last year which she has been intermittently using, last use was 8 AM this morning, 2 puffs.  Not have any previous history of asthma or COPD, does not use albuterol regularly.  Patient states she has been using Motrin, Tylenol for fevers.  Patient states her cough often triggers her shortness of breath, reporting some mild shortness of breath with  exertion, as well as at rest denies any chest pain, leg swelling, Dizziness, lightheadedness.  Denies any sick contacts.     Review of Systems   All other systems reviewed and are negative.          Objective       ED Triage Vitals   Temperature Pulse Blood Pressure Respirations SpO2 Patient Position - Orthostatic VS   01/24/25 1249 01/24/25 1245 01/24/25 1245 01/24/25 1245 01/24/25 1245 01/24/25 1245   (!) 101.6 °F (38.7 °C) (!) 120 147/79 (!) 28 99 % Sitting      Temp Source Heart Rate Source BP Location FiO2 (%) Pain Score    01/24/25 1249 01/24/25 1245 01/24/25 1245 -- 01/24/25 1417    Oral Monitor Right arm  Med Not Given for Pain - for MAR use only      Vitals      Date and Time Temp Pulse SpO2 Resp BP Pain Score FACES Pain Rating User   01/24/25 1444 99.6 °F (37.6 °C) 101 98 % 20 108/66 -- -- AB   01/24/25 1417 -- -- -- -- -- Med Not Given for Pain - for MAR use only -- AB   01/24/25 1412 100.8 °F (38.2 °C) 112 -- -- -- -- -- AB   01/24/25 1406 -- 133 96 % 22 123/74 -- -- TB   01/24/25 1249 101.6 °F (38.7 °C) -- -- -- -- -- -- MF   01/24/25 1245 -- 120 99 % 28 147/79 -- -- MF            Physical Exam  Vitals (Initially tachycardic, mildly tachypneic) reviewed.   Constitutional:       General: She is not in acute distress.     Appearance: Normal appearance. She is obese. She is not ill-appearing, toxic-appearing or diaphoretic.      Comments: Reporting some minimal shortness of breath during her conversation, appears well   HENT:      Right Ear: External ear normal.      Left Ear: External ear normal.      Mouth/Throat:      Mouth: Mucous membranes are moist.   Cardiovascular:      Rate and Rhythm: Regular rhythm. Tachycardia present.      Heart sounds: Normal heart sounds. No murmur heard.  Pulmonary:      Effort: Pulmonary effort is normal.      Breath sounds: Normal breath sounds. No wheezing, rhonchi or rales.      Comments: No obvious wheezing, rhonchi, rales  Abdominal:      Palpations: Abdomen is soft.       Tenderness: There is no abdominal tenderness.   Musculoskeletal:         General: Normal range of motion.      Cervical back: Neck supple. No tenderness.   Skin:     General: Skin is warm.      Capillary Refill: Capillary refill takes less than 2 seconds.   Neurological:      Mental Status: She is alert and oriented to person, place, and time.   Psychiatric:         Mood and Affect: Mood normal.         Behavior: Behavior normal.         Thought Content: Thought content normal.         Judgment: Judgment normal.         Results Reviewed       Procedure Component Value Units Date/Time    FLU/COVID Rapid Antigen (30 min. TAT) - Preferred screening test in ED [[id number]]  (Abnormal) Collected: 01/24/25 1251    Lab Status: Final result Specimen: Nares from Nose Updated: 01/24/25 1319     SARS COV Rapid Antigen Negative     Influenza A Rapid Antigen Negative     Influenza B Rapid Antigen Positive    Narrative:      This test has been performed using the Level Chefidel Ivania 2 FLU+SARS Antigen test under the Emergency Use Authorization (EUA). This test has been validated by the  and verified by the performing laboratory. The Ivania uses lateral flow immunofluorescent sandwich assay to detect SARS-COV, Influenza A and Influenza B Antigen.     The Quidel Ivania 2 SARS Antigen test does not differentiate between SARS-CoV and SARS-CoV-2.     Negative results are presumptive and may be confirmed with a molecular assay, if necessary, for patient management. Negative results do not rule out SARS-CoV-2 or influenza infection and should not be used as the sole basis for treatment or patient management decisions. A negative test result may occur if the level of antigen in a sample is below the limit of detection of this test.     Positive results are indicative of the presence of viral antigens, but do not rule out bacterial infection or co-infection with other viruses.     All test results should be used as an adjunct to  clinical observations and other information available to the provider.    FOR PEDIATRIC PATIENTS - copy/paste COVID Guidelines URL to browser: [email or web address]            No orders to display       Procedures    ED Medication and Procedure Management   Prior to Admission Medications   Prescriptions Last Dose Informant Patient Reported? Taking?   Diclofenac Sodium (VOLTAREN) 1 %   No No   Sig: Apply 2 g topically 4 (four) times a day   cyclobenzaprine (FLEXERIL) 10 mg tablet   No No   Sig: Take 1 tablet (10 mg total) by mouth 2 (two) times a day as needed for muscle spasms   ibuprofen (MOTRIN) 400 mg tablet   No No   Sig: Take 1 tablet (400 mg total) by mouth every 8 (eight) hours for 5 days   predniSONE 20 mg tablet   No No   Sig: Take 2 tabs daily for 5 days   Patient not taking: Reported on 12/30/2021       Facility-Administered Medications: None     Discharge Medication List as of 1/24/2025  2:46 PM        CONTINUE these medications which have NOT CHANGED    Details   cyclobenzaprine (FLEXERIL) 10 mg tablet Take 1 tablet (10 mg total) by mouth 2 (two) times a day as needed for muscle spasms, Starting Fri 12/31/2021, Print      Diclofenac Sodium (VOLTAREN) 1 % Apply 2 g topically 4 (four) times a day, Starting Fri 12/31/2021, Print      ibuprofen (MOTRIN) 400 mg tablet Take 1 tablet (400 mg total) by mouth every 8 (eight) hours for 5 days, Starting Fri 12/31/2021, Until Wed 1/5/2022, Print      predniSONE 20 mg tablet Take 2 tabs daily for 5 days, Print           No discharge procedures on file.  ED SEPSIS DOCUMENTATION   Time reflects when diagnosis was documented in both MDM as applicable and the Disposition within this note       Time User Action Codes Description Comment    1/24/2025  2:45 PM Guzman Stock Add [J11.1] Influenza                  Guzman CULP DO  01/24/25 1522

## 2025-01-24 NOTE — ED ATTENDING ATTESTATION
1/24/2025  I, Ino López DO, saw and evaluated the patient. I have discussed the patient with the resident/non-physician practitioner and agree with the resident's/non-physician practitioner's findings, Plan of Care, and MDM as documented in the resident's/non-physician practitioner's note, except where noted. All available labs and Radiology studies were reviewed.  I was present for key portions of any procedure(s) performed by the resident/non-physician practitioner and I was immediately available to provide assistance.       At this point I agree with the current assessment done in the Emergency Department.  I have conducted an independent evaluation of this patient a history and physical is as follows:    Patient is a 24-year-old female with no significant past medical history who presents with flulike symptoms.  Patient states that her symptoms began on Monday, 1/20/2025.  She describes cough, shortness of breath, headaches, myalgias, fevers.  She has used an inhaler intermittently, last time was this morning.  The albuterol inhaler was leftover from a previous bout of bronchitis.  She does not take albuterol regularly and denies a history of asthma or COPD.  She denies sick contacts.  She states that she works from home.  She did not receive the flu vaccine this year.  She describes shortness of breath, particularly with coughing episodes.  Her cough is largely nonproductive.  She denies any lower extremity edema or lower extremity pain.    On exam, patient is in no acute distress.  She does intermittently cough during exam.  She does not have dyspnea with conversation and does not have tachypnea on my exam.  Mildly decreased breath sounds in bilateral bases.  Heart is tachycardic, regular rhythm.  No murmurs.  No pitting edema in the lower extremities.  No calf tenderness.  Moist mucous membranes.    Vital signs improving.  She has defervesced in the emergency department.  Heart rate, respiratory rate  "have both improved.  Patient is in no respiratory distress.  No adventitious breath sounds on exam.  I have a low suspicion for pneumonia.  She is positive for influenza B.  Presentation consistent with influenza.  Given 5 days of symptoms, I do not believe that Tamiflu will be beneficial at this point.  We discussed symptomatic treatment and rest at home.  She agrees to follow-up with PCP and return to ED if symptoms worsen.    Portions of the above record have been created with voice recognition software.  Occasional wrong word or \"sound alike\" substitutions may have occurred due to the inherent limitations of voice recognition software.  Read the chart carefully and recognize, using context, where substitutions may have occurred.      ED Course         Critical Care Time  Procedures      "

## 2025-09-05 ENCOUNTER — NEW PATIENT (OUTPATIENT)
Dept: URBAN - METROPOLITAN AREA CLINIC 6 | Facility: CLINIC | Age: 25
End: 2025-09-05

## 2025-09-05 DIAGNOSIS — R51.9: ICD-10-CM

## 2025-09-05 DIAGNOSIS — H40.053: ICD-10-CM

## 2025-09-05 PROCEDURE — 99204 OFFICE O/P NEW MOD 45 MIN: CPT

## 2025-09-05 PROCEDURE — 92134 CPTRZ OPH DX IMG PST SGM RTA: CPT

## 2025-09-05 ASSESSMENT — VISUAL ACUITY
OS_CC: 20/20
OU_SC: J1+
OD_CC: 20/20

## 2025-09-05 ASSESSMENT — TONOMETRY
OS_IOP_MMHG: 19
OD_IOP_MMHG: 19
OD_IOP_MMHG: 25
OS_IOP_MMHG: 24